# Patient Record
Sex: MALE | URBAN - METROPOLITAN AREA
[De-identification: names, ages, dates, MRNs, and addresses within clinical notes are randomized per-mention and may not be internally consistent; named-entity substitution may affect disease eponyms.]

---

## 2018-07-17 ENCOUNTER — APPOINTMENT (RX ONLY)
Dept: URBAN - METROPOLITAN AREA CLINIC 127 | Facility: CLINIC | Age: 73
Setting detail: DERMATOLOGY
End: 2018-07-17

## 2018-07-17 DIAGNOSIS — L40.8 OTHER PSORIASIS: ICD-10-CM

## 2018-07-17 PROBLEM — L70.0 ACNE VULGARIS: Status: ACTIVE | Noted: 2018-07-17

## 2018-07-17 PROBLEM — Z85.828 PERSONAL HISTORY OF OTHER MALIGNANT NEOPLASM OF SKIN: Status: ACTIVE | Noted: 2018-07-17

## 2018-07-17 PROBLEM — Z85.79 PERSONAL HISTORY OF OTHER MALIGNANT NEOPLASMS OF LYMPHOID, HEMATOPOIETIC AND RELATED TISSUES: Status: ACTIVE | Noted: 2018-07-17

## 2018-07-17 PROCEDURE — ? COUNSELING

## 2018-07-17 PROCEDURE — ? TREATMENT REGIMEN

## 2018-07-17 PROCEDURE — ? PRESCRIPTION

## 2018-07-17 PROCEDURE — 99202 OFFICE O/P NEW SF 15 MIN: CPT

## 2018-07-17 RX ORDER — FLUOCINONIDE 0.5 MG/ML
SOLUTION TOPICAL
Qty: 1 | Refills: 1 | Status: ERX | COMMUNITY
Start: 2018-07-17

## 2018-07-17 RX ADMIN — FLUOCINONIDE: 0.5 SOLUTION TOPICAL at 14:12

## 2018-07-17 ASSESSMENT — LOCATION SIMPLE DESCRIPTION DERM: LOCATION SIMPLE: SCALP

## 2018-07-17 ASSESSMENT — LOCATION ZONE DERM: LOCATION ZONE: SCALP

## 2018-07-17 ASSESSMENT — LOCATION DETAILED DESCRIPTION DERM: LOCATION DETAILED: RIGHT SUPERIOR PARIETAL SCALP

## 2018-07-17 NOTE — PROCEDURE: TREATMENT REGIMEN
Initiate Treatment: Increase hair washing to 5-7 days a week\\nNeutrogena T/gel shampoo \\nFluocinonide solution bid x 2 weeks then stop
Detail Level: Zone

## 2018-08-07 ENCOUNTER — APPOINTMENT (RX ONLY)
Dept: URBAN - METROPOLITAN AREA CLINIC 127 | Facility: CLINIC | Age: 73
Setting detail: DERMATOLOGY
End: 2018-08-07

## 2018-08-07 DIAGNOSIS — L28.1 PRURIGO NODULARIS: ICD-10-CM

## 2018-08-07 DIAGNOSIS — L40.8 OTHER PSORIASIS: ICD-10-CM | Status: RESOLVED

## 2018-08-07 PROCEDURE — ? COUNSELING

## 2018-08-07 PROCEDURE — 99213 OFFICE O/P EST LOW 20 MIN: CPT

## 2018-08-07 PROCEDURE — ? TREATMENT REGIMEN

## 2018-08-07 ASSESSMENT — LOCATION ZONE DERM
LOCATION ZONE: NECK
LOCATION ZONE: SCALP

## 2018-08-07 ASSESSMENT — LOCATION DETAILED DESCRIPTION DERM
LOCATION DETAILED: LEFT INFERIOR LATERAL NECK
LOCATION DETAILED: RIGHT SUPERIOR PARIETAL SCALP

## 2018-08-07 ASSESSMENT — LOCATION SIMPLE DESCRIPTION DERM
LOCATION SIMPLE: LEFT ANTERIOR NECK
LOCATION SIMPLE: SCALP

## 2018-08-07 NOTE — PROCEDURE: TREATMENT REGIMEN
Continue Regimen: Increase hair washing to 5-7 days a week\\nNeutrogena T/gel shampoo 3x/week for maintenance, daily during flares\\nFluocinonide solution bid x 2 weeks PRN flares only
Detail Level: Zone

## 2025-08-06 ENCOUNTER — NEW PATIENT (OUTPATIENT)
Age: 80
End: 2025-08-06

## 2025-08-06 DIAGNOSIS — Z96.1: ICD-10-CM

## 2025-08-06 DIAGNOSIS — H11.32: ICD-10-CM

## 2025-08-06 PROCEDURE — 99203 OFFICE O/P NEW LOW 30 MIN: CPT
